# Patient Record
Sex: MALE | Race: OTHER | NOT HISPANIC OR LATINO | URBAN - METROPOLITAN AREA
[De-identification: names, ages, dates, MRNs, and addresses within clinical notes are randomized per-mention and may not be internally consistent; named-entity substitution may affect disease eponyms.]

---

## 2018-07-30 ENCOUNTER — HISTORICAL (OUTPATIENT)
Dept: ADMINISTRATIVE | Facility: HOSPITAL | Age: 63
End: 2018-07-30

## 2018-07-30 LAB
ABS NEUT (OLG): 3.12 X10(3)/MCL (ref 2.1–9.2)
ALBUMIN SERPL-MCNC: 3.8 GM/DL (ref 3.4–5)
ALBUMIN/GLOB SERPL: 1.3 RATIO (ref 1.1–2)
ALP SERPL-CCNC: 70 UNIT/L (ref 50–136)
ALT SERPL-CCNC: 22 UNIT/L (ref 12–78)
APPEARANCE, UA: CLEAR
AST SERPL-CCNC: 22 UNIT/L (ref 15–37)
BACTERIA SPEC CULT: NORMAL /HPF
BASOPHILS # BLD AUTO: 0 X10(3)/MCL (ref 0–0.2)
BASOPHILS NFR BLD AUTO: 0 %
BILIRUB SERPL-MCNC: 0.6 MG/DL (ref 0.2–1)
BILIRUB UR QL STRIP: NEGATIVE
BILIRUBIN DIRECT+TOT PNL SERPL-MCNC: 0.2 MG/DL (ref 0–0.5)
BILIRUBIN DIRECT+TOT PNL SERPL-MCNC: 0.4 MG/DL (ref 0–0.8)
BUN SERPL-MCNC: 19 MG/DL (ref 7–18)
CALCIUM SERPL-MCNC: 8.7 MG/DL (ref 8.5–10.1)
CHLORIDE SERPL-SCNC: 107 MMOL/L (ref 98–107)
CHOLEST SERPL-MCNC: 211 MG/DL (ref 0–200)
CHOLEST/HDLC SERPL: 3 {RATIO} (ref 0–5)
CO2 SERPL-SCNC: 28 MMOL/L (ref 21–32)
COLOR UR: YELLOW
CREAT SERPL-MCNC: 0.84 MG/DL (ref 0.7–1.3)
EOSINOPHIL # BLD AUTO: 0.2 X10(3)/MCL (ref 0–0.9)
EOSINOPHIL NFR BLD AUTO: 4 %
ERYTHROCYTE [DISTWIDTH] IN BLOOD BY AUTOMATED COUNT: 13.1 % (ref 11.5–17)
GLOBULIN SER-MCNC: 3 GM/DL (ref 2.4–3.5)
GLUCOSE (UA): NEGATIVE
GLUCOSE SERPL-MCNC: 104 MG/DL (ref 74–106)
HCT VFR BLD AUTO: 42.8 % (ref 42–52)
HDLC SERPL-MCNC: 70 MG/DL (ref 35–60)
HGB BLD-MCNC: 14.2 GM/DL (ref 14–18)
HGB UR QL STRIP: NEGATIVE
KETONES UR QL STRIP: NEGATIVE
LDLC SERPL CALC-MCNC: 112 MG/DL (ref 0–129)
LEUKOCYTE ESTERASE UR QL STRIP: NEGATIVE
LYMPHOCYTES # BLD AUTO: 1.7 X10(3)/MCL (ref 0.6–4.6)
LYMPHOCYTES NFR BLD AUTO: 30 %
MCH RBC QN AUTO: 31.1 PG (ref 27–31)
MCHC RBC AUTO-ENTMCNC: 33.2 GM/DL (ref 33–36)
MCV RBC AUTO: 93.7 FL (ref 80–94)
MONOCYTES # BLD AUTO: 0.4 X10(3)/MCL (ref 0.1–1.3)
MONOCYTES NFR BLD AUTO: 8 %
NEUTROPHILS # BLD AUTO: 3.12 X10(3)/MCL (ref 2.1–9.2)
NEUTROPHILS NFR BLD AUTO: 57 %
NITRITE UR QL STRIP: NEGATIVE
PH UR STRIP: 5 [PH] (ref 5–9)
PLATELET # BLD AUTO: 165 X10(3)/MCL (ref 130–400)
PMV BLD AUTO: 10.2 FL (ref 9.4–12.4)
POTASSIUM SERPL-SCNC: 4.3 MMOL/L (ref 3.5–5.1)
PROT SERPL-MCNC: 6.8 GM/DL (ref 6.4–8.2)
PROT UR QL STRIP: NEGATIVE
PSA SERPL-MCNC: 4.26 NG/ML (ref 0–4)
RBC # BLD AUTO: 4.57 X10(6)/MCL (ref 4.7–6.1)
RBC #/AREA URNS HPF: NORMAL /[HPF]
SODIUM SERPL-SCNC: 142 MMOL/L (ref 136–145)
SP GR UR STRIP: 1.02 (ref 1–1.03)
SQUAMOUS EPITHELIAL, UA: NORMAL
TRIGL SERPL-MCNC: 147 MG/DL (ref 30–150)
UROBILINOGEN UR STRIP-ACNC: 0.2
VLDLC SERPL CALC-MCNC: 29 MG/DL
WBC # SPEC AUTO: 5.5 X10(3)/MCL (ref 4.5–11.5)
WBC #/AREA URNS HPF: NORMAL /HPF

## 2023-12-12 ENCOUNTER — HOSPITAL ENCOUNTER (EMERGENCY)
Facility: HOSPITAL | Age: 68
Discharge: HOME | End: 2023-12-12
Attending: EMERGENCY MEDICINE
Payer: COMMERCIAL

## 2023-12-12 VITALS
RESPIRATION RATE: 17 BRPM | HEART RATE: 62 BPM | BODY MASS INDEX: 25.06 KG/M2 | OXYGEN SATURATION: 99 % | TEMPERATURE: 97 F | DIASTOLIC BLOOD PRESSURE: 79 MMHG | WEIGHT: 185 LBS | SYSTOLIC BLOOD PRESSURE: 141 MMHG | HEIGHT: 72 IN

## 2023-12-12 DIAGNOSIS — R55 NEAR SYNCOPE: Primary | ICD-10-CM

## 2023-12-12 DIAGNOSIS — T50.901A ACCIDENTAL DRUG INGESTION, INITIAL ENCOUNTER: ICD-10-CM

## 2023-12-12 LAB
ALBUMIN SERPL-MCNC: 4.1 G/DL (ref 3.4–4.8)
ALBUMIN/GLOB SERPL: 1.8 RATIO (ref 1.1–2)
ALP SERPL-CCNC: 88 UNIT/L (ref 40–150)
ALT SERPL-CCNC: 83 UNIT/L (ref 0–55)
APTT PPP: 25.8 SECONDS (ref 23.2–33.7)
AST SERPL-CCNC: 63 UNIT/L (ref 5–34)
BASOPHILS # BLD AUTO: 0.02 X10(3)/MCL
BASOPHILS NFR BLD AUTO: 0.2 %
BILIRUB SERPL-MCNC: 0.7 MG/DL
BNP BLD-MCNC: 57.5 PG/ML
BUN SERPL-MCNC: 19.5 MG/DL (ref 8.4–25.7)
CALCIUM SERPL-MCNC: 9.3 MG/DL (ref 8.8–10)
CHLORIDE SERPL-SCNC: 108 MMOL/L (ref 98–107)
CO2 SERPL-SCNC: 26 MMOL/L (ref 23–31)
CREAT SERPL-MCNC: 1.17 MG/DL (ref 0.73–1.18)
D DIMER PPP IA.FEU-MCNC: <0.27 UG/ML FEU (ref 0–0.5)
EOSINOPHIL # BLD AUTO: 0.11 X10(3)/MCL (ref 0–0.9)
EOSINOPHIL NFR BLD AUTO: 1.3 %
ERYTHROCYTE [DISTWIDTH] IN BLOOD BY AUTOMATED COUNT: 13.2 % (ref 11.5–17)
GFR SERPLBLD CREATININE-BSD FMLA CKD-EPI: >60 MLS/MIN/1.73/M2
GLOBULIN SER-MCNC: 2.3 GM/DL (ref 2.4–3.5)
GLUCOSE SERPL-MCNC: 124 MG/DL (ref 82–115)
HCT VFR BLD AUTO: 42.5 % (ref 42–52)
HGB BLD-MCNC: 14.4 G/DL (ref 14–18)
IMM GRANULOCYTES # BLD AUTO: 0.03 X10(3)/MCL (ref 0–0.04)
IMM GRANULOCYTES NFR BLD AUTO: 0.4 %
INR PPP: 1
LYMPHOCYTES # BLD AUTO: 2.68 X10(3)/MCL (ref 0.6–4.6)
LYMPHOCYTES NFR BLD AUTO: 31.4 %
MCH RBC QN AUTO: 30.6 PG (ref 27–31)
MCHC RBC AUTO-ENTMCNC: 33.9 G/DL (ref 33–36)
MCV RBC AUTO: 90.2 FL (ref 80–94)
MONOCYTES # BLD AUTO: 0.54 X10(3)/MCL (ref 0.1–1.3)
MONOCYTES NFR BLD AUTO: 6.3 %
NEUTROPHILS # BLD AUTO: 5.16 X10(3)/MCL (ref 2.1–9.2)
NEUTROPHILS NFR BLD AUTO: 60.4 %
NRBC BLD AUTO-RTO: 0 %
PLATELET # BLD AUTO: 206 X10(3)/MCL (ref 130–400)
PMV BLD AUTO: 10.4 FL (ref 7.4–10.4)
POTASSIUM SERPL-SCNC: 4 MMOL/L (ref 3.5–5.1)
PROT SERPL-MCNC: 6.4 GM/DL (ref 5.8–7.6)
PROTHROMBIN TIME: 13.1 SECONDS (ref 12.5–14.5)
RBC # BLD AUTO: 4.71 X10(6)/MCL (ref 4.7–6.1)
SODIUM SERPL-SCNC: 141 MMOL/L (ref 136–145)
TROPONIN I SERPL-MCNC: <0.01 NG/ML (ref 0–0.04)
TROPONIN I SERPL-MCNC: <0.01 NG/ML (ref 0–0.04)
WBC # SPEC AUTO: 8.54 X10(3)/MCL (ref 4.5–11.5)

## 2023-12-12 PROCEDURE — 83880 ASSAY OF NATRIURETIC PEPTIDE: CPT | Performed by: EMERGENCY MEDICINE

## 2023-12-12 PROCEDURE — 84484 ASSAY OF TROPONIN QUANT: CPT | Performed by: EMERGENCY MEDICINE

## 2023-12-12 PROCEDURE — 96361 HYDRATE IV INFUSION ADD-ON: CPT

## 2023-12-12 PROCEDURE — 85610 PROTHROMBIN TIME: CPT | Performed by: EMERGENCY MEDICINE

## 2023-12-12 PROCEDURE — 25000003 PHARM REV CODE 250: Performed by: EMERGENCY MEDICINE

## 2023-12-12 PROCEDURE — 80053 COMPREHEN METABOLIC PANEL: CPT | Performed by: EMERGENCY MEDICINE

## 2023-12-12 PROCEDURE — 85379 FIBRIN DEGRADATION QUANT: CPT | Performed by: EMERGENCY MEDICINE

## 2023-12-12 PROCEDURE — 85025 COMPLETE CBC W/AUTO DIFF WBC: CPT | Performed by: EMERGENCY MEDICINE

## 2023-12-12 PROCEDURE — 99284 EMERGENCY DEPT VISIT MOD MDM: CPT | Mod: 25

## 2023-12-12 PROCEDURE — 93005 ELECTROCARDIOGRAM TRACING: CPT

## 2023-12-12 PROCEDURE — 85730 THROMBOPLASTIN TIME PARTIAL: CPT | Performed by: EMERGENCY MEDICINE

## 2023-12-12 PROCEDURE — 96360 HYDRATION IV INFUSION INIT: CPT

## 2023-12-12 RX ADMIN — SODIUM CHLORIDE 1000 ML: 9 INJECTION, SOLUTION INTRAVENOUS at 02:12

## 2023-12-12 NOTE — ED PROVIDER NOTES
Encounter Date: 12/12/2023    SCRIBE #1 NOTE: I, Iglesia Meehan, am scribing for, and in the presence of,  Radha Agustin MD. I have scribed the following portions of the note - Other sections scribed: HPI, ROS, PE.       History     Chief Complaint   Patient presents with    Near Syncope      Pt. Reports an episode of generalized weakness, dizziness, and near syncope. Pt. Reports may have taken double BP medication (unknown)     68 year old male with a pmhx of HTN presents to the ED for a near-syncopal episode onset PTA.  The patient reports associated symptoms of feeling hot, dizziness, and generalized weakness.  The patient denies any LOC.  The patient reports that he was visiting his wife who was admitted to the hospital when he began feeling hot.  The patient suspects he may have taken his blood pressure medication twice on accident today.  The patient states that he takes Losartan.    The history is provided by the patient. No  was used.     Review of patient's allergies indicates:  No Known Allergies  No past medical history on file.  No past surgical history on file.  No family history on file.     Review of Systems   Constitutional:  Negative for fatigue, fever and unexpected weight change.   HENT:  Negative for congestion and rhinorrhea.    Eyes:  Negative for pain.   Respiratory:  Negative for chest tightness, shortness of breath and wheezing.    Cardiovascular:  Negative for chest pain.   Gastrointestinal:  Negative for abdominal pain, constipation, diarrhea, nausea and vomiting.   Genitourinary:  Negative for dysuria.   Musculoskeletal:  Negative for back pain and neck pain.   Skin:  Negative for rash.   Allergic/Immunologic: Negative for environmental allergies, food allergies and immunocompromised state.   Neurological:  Positive for dizziness and weakness (generalized). Negative for speech difficulty.   Hematological:  Does not bruise/bleed easily.   Psychiatric/Behavioral:   Negative for sleep disturbance and suicidal ideas.        Physical Exam     Initial Vitals [12/12/23 1357]   BP Pulse Resp Temp SpO2   105/60 108 (!) 26 97.3 °F (36.3 °C) 99 %      MAP       --         Physical Exam    Nursing note and vitals reviewed.  Constitutional: He appears well-developed and well-nourished. He is not diaphoretic. No distress.   HENT:   Head: Normocephalic and atraumatic.   Eyes: Conjunctivae and EOM are normal. Pupils are equal, round, and reactive to light.   Neck:   Normal range of motion.  Cardiovascular:  Normal rate, regular rhythm, normal heart sounds and intact distal pulses.           No murmur heard.  Pulmonary/Chest: Breath sounds normal. No respiratory distress. He has no wheezes. He has no rales.   Abdominal: Abdomen is soft. He exhibits no distension. There is no abdominal tenderness.   Musculoskeletal:         General: No tenderness or edema. Normal range of motion.      Cervical back: Normal range of motion.     Neurological: He is alert and oriented to person, place, and time. No cranial nerve deficit.   Skin: Skin is warm and dry. Capillary refill takes less than 2 seconds. No rash noted. No erythema.   Psychiatric: He has a normal mood and affect.         ED Course   Procedures  Labs Reviewed   COMPREHENSIVE METABOLIC PANEL - Abnormal; Notable for the following components:       Result Value    Chloride 108 (*)     Glucose Level 124 (*)     Globulin 2.3 (*)     Alanine Aminotransferase 83 (*)     Aspartate Aminotransferase 63 (*)     All other components within normal limits   PROTIME-INR - Normal   APTT - Normal   TROPONIN I - Normal   B-TYPE NATRIURETIC PEPTIDE - Normal   D DIMER, QUANTITATIVE - Normal   TROPONIN I - Normal   CBC W/ AUTO DIFFERENTIAL    Narrative:     The following orders were created for panel order CBC auto differential.  Procedure                               Abnormality         Status                     ---------                                -----------         ------                     CBC with Differential[[id number]]                           Final result                 Please view results for these tests on the individual orders.   CBC WITH DIFFERENTIAL     EKG Readings: (Independently Interpreted)   Initial Reading: No STEMI. Rhythm: Normal Sinus Rhythm. Heart Rate: 63. Ectopy: No Ectopy. Conduction: Normal. ST Segments: Normal ST Segments. T Waves: Normal. Axis: Normal. Clinical Impression: Normal Sinus Rhythm   12/12/2023 @ 1353              Medications   sodium chloride 0.9% bolus 1,000 mL 1,000 mL (0 mLs Intravenous Stopped 12/12/23 4107)     Medical Decision Making  Problems Addressed:  Accidental drug ingestion, initial encounter: acute illness or injury  Near syncope: acute illness or injury    Amount and/or Complexity of Data Reviewed  Labs: ordered.      ED assessment:    Mr. Borja was here visiting his wife when he had a near syncopal episode w/ vasovagal prodrom, improved once lying flat. Suspects he may have taken his BP meds twice today.     Differential diagnosis (including but not limited to):   Accidental medication overdose, hypoglycemia, vasovagal response, orthostatic hypotension, ERASMO, electrolyte derangements, dehydration, emotional distress    ED management:   See ED course below    Amount and/or Complexity of Data Reviewed  Independent historian: none   Summary of history:   External data reviewed: notes from previous ED visits  Summary of data reviewed: only prior ED visit in 2021 w/ COVID 19  Risk and benefits of testing: discussed   Labs: ordered and reviewed    ECG/medicine tests: ordered and independent interpretation performed (see above or ED course)      Risk  Shared decision making     Critical Care  none    I, Radha Agustin MD personally performed the history, PE, MDM, and procedures as documented above and agree with the scribe's documentation.           Scribe Attestation:   Scribe #1: I performed the above  scribed service and the documentation accurately describes the services I performed. I attest to the accuracy of the note.    Attending Attestation:           Physician Attestation for Scribe:  Physician Attestation Statement for Scribe #1: I, Radha Agustin MD, reviewed documentation, as scribed by Iglesia Meehan in my presence, and it is both accurate and complete.             ED Course as of 12/12/23 1743   Tue Dec 12, 2023   1742 Remains symptomatically improved.  Blood pressures have normalized.  Repeat troponin negative.  Advised to monitor for any recurrent symptoms.  May resume home medications tomorrow morning. ED return precautions reviewed at the bedside and provided in the written discharge instructions. All questions answered to the best of my ability.      [KS]      ED Course User Index  [KS] Radha Agustin MD                           Clinical Impression:  Final diagnoses:  [R55] Near syncope (Primary)  [T50.901A] Accidental drug ingestion, initial encounter          ED Disposition Condition    Discharge Stable          ED Prescriptions    None       Follow-up Information       Follow up With Specialties Details Why Contact Info    Your primary care physician  Schedule an appointment as soon as possible for a visit   Call your primary care physician or you can call [phone] to schedule with a primary care physician    [facility] - Emergency Dept Emergency Medicine  As needed, If symptoms worsen [address] [facility] [id number] [phone]             Radha Agustin MD  12/23/23 6361

## 2025-01-16 ENCOUNTER — TELEPHONE (OUTPATIENT)
Dept: INTERNAL MEDICINE | Facility: CLINIC | Age: 70
End: 2025-01-16
Payer: MEDICARE

## 2025-01-27 ENCOUNTER — OFFICE VISIT (OUTPATIENT)
Dept: INTERNAL MEDICINE | Facility: CLINIC | Age: 70
End: 2025-01-27
Payer: MEDICARE

## 2025-01-27 VITALS
SYSTOLIC BLOOD PRESSURE: 104 MMHG | OXYGEN SATURATION: 96 % | DIASTOLIC BLOOD PRESSURE: 68 MMHG | HEART RATE: 71 BPM | BODY MASS INDEX: 24.3 KG/M2 | HEIGHT: 73 IN | WEIGHT: 183.38 LBS | RESPIRATION RATE: 18 BRPM

## 2025-01-27 DIAGNOSIS — E78.5 HYPERLIPIDEMIA, UNSPECIFIED HYPERLIPIDEMIA TYPE: Primary | ICD-10-CM

## 2025-01-27 DIAGNOSIS — I10 ESSENTIAL HYPERTENSION: ICD-10-CM

## 2025-01-27 DIAGNOSIS — Z00.00 WELLNESS EXAMINATION: ICD-10-CM

## 2025-01-27 DIAGNOSIS — C61 PROSTATE CANCER: ICD-10-CM

## 2025-01-27 PROCEDURE — 99204 OFFICE O/P NEW MOD 45 MIN: CPT | Mod: ,,, | Performed by: STUDENT IN AN ORGANIZED HEALTH CARE EDUCATION/TRAINING PROGRAM

## 2025-01-27 RX ORDER — ASPIRIN 81 MG/1
81 TABLET ORAL
COMMUNITY
Start: 2024-12-11

## 2025-01-27 RX ORDER — VIT C/E/ZN/COPPR/LUTEIN/ZEAXAN 250MG-90MG
CAPSULE ORAL DAILY
COMMUNITY

## 2025-01-27 RX ORDER — IRBESARTAN 150 MG/1
TABLET ORAL
COMMUNITY
Start: 2021-10-04

## 2025-01-27 RX ORDER — ROSUVASTATIN CALCIUM 10 MG/1
TABLET, COATED ORAL
COMMUNITY
Start: 2022-01-03

## 2025-01-27 RX ORDER — ZINC GLUCONATE 50 MG
50 TABLET ORAL DAILY
COMMUNITY

## 2025-01-27 RX ORDER — WITCH HAZEL 50 %
2000 PADS, MEDICATED (EA) TOPICAL DAILY
COMMUNITY

## 2025-01-27 NOTE — ASSESSMENT & PLAN NOTE
Will request recent labs from [facility]  Continue rosuvastatin  Has had some muscle cramps, advised patient to take CoQ10 over the counter

## 2025-01-27 NOTE — ASSESSMENT & PLAN NOTE
Colonoscopy: done with [facility], will need to request records  Labs: done with [facility], will request records

## 2025-01-27 NOTE — PROGRESS NOTES
Subjective:      Chris Borja  01/27/2025  [id number]      Chief Complaint: Establish Care       HPI:  Mr Perez is a 68 y/o male patient who is here to establish care. Patient has hypertension, hyperlipidemia, recently diagnosed prostate cancer. Patient does not have new complaints at this time. He is established with Urology and will start radiation treatment soon. Plan of treatment is radiation and medical treatment.     Past Medical History:   Diagnosis Date    High blood cholesterol     Hyperlipidemia     Prostate cancer      Past Surgical History:   Procedure Laterality Date    TOTAL KNEE ARTHROPLASTY Right      Family History   Problem Relation Name Age of Onset    No Known Problems Mother      Cancer Father      No Known Problems Sister      Cancer Brother       Social History     Tobacco Use    Smoking status: Never    Smokeless tobacco: Never   Substance and Sexual Activity    Alcohol use: Yes    Drug use: Not on file    Sexual activity: Not on file     Review of patient's allergies indicates:  No Known Allergies    The following were reviewed at this visit: active problem list, medication list, allergies, family history, social history, and health maintenance.    Medications:    Current Outpatient Medications:     aspirin (ECOTRIN) 81 MG EC tablet, Take 81 mg by mouth., Disp: , Rfl:     cholecalciferol, vitamin D3, (VITAMIN D3) 250 mcg (10,000 unit) Cap capsule, Take by mouth once daily., Disp: , Rfl:     cyanocobalamin 2000 MCG tablet, Take 2,000 mcg by mouth once daily., Disp: , Rfl:     irbesartan (AVAPRO) 150 MG tablet, 90, Disp: , Rfl:     rosuvastatin (CRESTOR) 10 MG tablet, , Disp: , Rfl:     zinc gluconate 50 mg tablet, Take 50 mg by mouth once daily., Disp: , Rfl:       Medications have been reviewed and reconciled with patient at this visit.  Barriers to medications reviewed with patient.    Adverse reactions to current medications reviewed with patient..    Over the counter medications  "reviewed and reconciled with patient.  Review of Systems   Constitutional:  Negative for chills, diaphoresis, fever and weight loss.   HENT:  Negative for congestion, ear discharge, sinus pain and sore throat.    Eyes:  Negative for photophobia.   Respiratory:  Negative for cough, hemoptysis and shortness of breath.    Cardiovascular:  Negative for chest pain, palpitations, claudication, leg swelling and PND.   Gastrointestinal:  Negative for constipation, diarrhea, nausea and vomiting.   Genitourinary:  Negative for dysuria, frequency and urgency.   Musculoskeletal:  Negative for back pain, joint pain, myalgias and neck pain.   Skin:  Negative for itching and rash.   Neurological:  Negative for dizziness, focal weakness and headaches.   Psychiatric/Behavioral:  Negative for depression. The patient does not have insomnia.            Objective:      Vitals:    01/27/25 1332   BP: 104/68   Pulse: 71   Resp: 18   SpO2: 96%   Weight: 83.2 kg (183 lb 6.4 oz)   Height: 6' 1" (1.854 m)       Physical Exam  Constitutional:       Appearance: Normal appearance.   HENT:      Head: Normocephalic and atraumatic.   Cardiovascular:      Rate and Rhythm: Normal rate and regular rhythm.      Pulses: Normal pulses.   Pulmonary:      Effort: Pulmonary effort is normal.      Breath sounds: Normal breath sounds.   Abdominal:      General: Abdomen is flat. Bowel sounds are normal. There is no distension.      Palpations: Abdomen is soft.      Tenderness: There is no abdominal tenderness.   Musculoskeletal:         General: Normal range of motion.      Right lower leg: No edema.      Left lower leg: No edema.   Lymphadenopathy:      Cervical: No cervical adenopathy.   Neurological:      General: No focal deficit present.      Mental Status: He is alert and oriented to person, place, and time.               Assessment and Plan:       1. Hyperlipidemia, unspecified hyperlipidemia type  Assessment & Plan:  Will request recent labs from " [facility]  Continue rosuvastatin  Has had some muscle cramps, advised patient to take CoQ10 over the counter       2. Prostate cancer  Assessment & Plan:  Established with Urology   Will start radiation treatment soon      3. Essential hypertension  Assessment & Plan:  Controlled  Continue irbesartan       4. Wellness examination  Assessment & Plan:  Colonoscopy: done with [facility], will need to request records  Labs: done with [facility], will request records               Follow up: Follow up in about 1 year (around 1/27/2026) for wellness, Labs check.

## 2025-02-28 ENCOUNTER — PATIENT MESSAGE (OUTPATIENT)
Dept: ADMINISTRATIVE | Facility: HOSPITAL | Age: 70
End: 2025-02-28
Payer: MEDICARE

## 2025-02-28 DIAGNOSIS — Z12.12 SCREENING FOR RECTAL CANCER: Primary | ICD-10-CM

## 2025-03-01 DIAGNOSIS — Z12.11 SCREENING FOR COLON CANCER: ICD-10-CM

## 2025-04-01 ENCOUNTER — PATIENT MESSAGE (OUTPATIENT)
Dept: ADMINISTRATIVE | Facility: HOSPITAL | Age: 70
End: 2025-04-01
Payer: MEDICARE

## 2025-05-23 ENCOUNTER — HOSPITAL ENCOUNTER (INPATIENT)
Facility: HOSPITAL | Age: 70
LOS: 1 days | Discharge: HOME | DRG: 312 | End: 2025-05-24
Attending: INTERNAL MEDICINE | Admitting: STUDENT IN AN ORGANIZED HEALTH CARE EDUCATION/TRAINING PROGRAM
Payer: MEDICARE

## 2025-05-23 DIAGNOSIS — R55 SYNCOPE: Primary | ICD-10-CM

## 2025-05-23 LAB
ALBUMIN SERPL-MCNC: 4.4 G/DL (ref 3.4–4.8)
ALBUMIN/GLOB SERPL: 1.2 RATIO (ref 1.1–2)
ALP SERPL-CCNC: 97 UNIT/L (ref 40–150)
ALT SERPL-CCNC: 36 UNIT/L (ref 0–55)
ANION GAP SERPL CALC-SCNC: 11 MEQ/L
AST SERPL-CCNC: 40 UNIT/L (ref 11–45)
BACTERIA #/AREA URNS AUTO: ABNORMAL /HPF
BASOPHILS # BLD AUTO: 0.01 X10(3)/MCL
BASOPHILS NFR BLD AUTO: 0.1 %
BILIRUB SERPL-MCNC: 0.4 MG/DL
BILIRUB UR QL STRIP.AUTO: NEGATIVE
BUN SERPL-MCNC: 29.1 MG/DL (ref 8.4–25.7)
CALCIUM SERPL-MCNC: 9.8 MG/DL (ref 8.8–10)
CHLORIDE SERPL-SCNC: 105 MMOL/L (ref 98–107)
CLARITY UR: CLEAR
CO2 SERPL-SCNC: 22 MMOL/L (ref 23–31)
COLOR UR AUTO: ABNORMAL
CREAT SERPL-MCNC: 0.95 MG/DL (ref 0.72–1.25)
CREAT/UREA NIT SERPL: 31
EOSINOPHIL # BLD AUTO: 0.15 X10(3)/MCL (ref 0–0.9)
EOSINOPHIL NFR BLD AUTO: 1.8 %
ERYTHROCYTE [DISTWIDTH] IN BLOOD BY AUTOMATED COUNT: 13.2 % (ref 11.5–17)
GFR SERPLBLD CREATININE-BSD FMLA CKD-EPI: >60 ML/MIN/1.73/M2
GLOBULIN SER-MCNC: 3.8 GM/DL (ref 2.4–3.5)
GLUCOSE SERPL-MCNC: 100 MG/DL (ref 82–115)
GLUCOSE UR QL STRIP: NORMAL
HCT VFR BLD AUTO: 42.8 % (ref 42–52)
HGB BLD-MCNC: 14.3 G/DL (ref 14–18)
HGB UR QL STRIP: NEGATIVE
IMM GRANULOCYTES # BLD AUTO: 0.03 X10(3)/MCL (ref 0–0.04)
IMM GRANULOCYTES NFR BLD AUTO: 0.4 %
KETONES UR QL STRIP: NEGATIVE
LEUKOCYTE ESTERASE UR QL STRIP: NEGATIVE
LYMPHOCYTES # BLD AUTO: 1.35 X10(3)/MCL (ref 0.6–4.6)
LYMPHOCYTES NFR BLD AUTO: 15.9 %
MAGNESIUM SERPL-MCNC: 2.3 MG/DL (ref 1.6–2.6)
MCH RBC QN AUTO: 30.6 PG (ref 27–31)
MCHC RBC AUTO-ENTMCNC: 33.4 G/DL (ref 33–36)
MCV RBC AUTO: 91.5 FL (ref 80–94)
MONOCYTES # BLD AUTO: 0.5 X10(3)/MCL (ref 0.1–1.3)
MONOCYTES NFR BLD AUTO: 5.9 %
MUCOUS THREADS URNS QL MICRO: ABNORMAL /LPF
NEUTROPHILS # BLD AUTO: 6.44 X10(3)/MCL (ref 2.1–9.2)
NEUTROPHILS NFR BLD AUTO: 75.9 %
NITRITE UR QL STRIP: NEGATIVE
NRBC BLD AUTO-RTO: 0 %
OHS QRS DURATION: 84 MS
OHS QTC CALCULATION: 425 MS
PH UR STRIP: 5 [PH]
PLATELET # BLD AUTO: 178 X10(3)/MCL (ref 130–400)
PMV BLD AUTO: 10.2 FL (ref 7.4–10.4)
POTASSIUM SERPL-SCNC: 5.1 MMOL/L (ref 3.5–5.1)
POTASSIUM SERPL-SCNC: 5.4 MMOL/L (ref 3.5–5.1)
PROT SERPL-MCNC: 8.2 GM/DL (ref 5.8–7.6)
PROT UR QL STRIP: NEGATIVE
RBC # BLD AUTO: 4.68 X10(6)/MCL (ref 4.7–6.1)
RBC #/AREA URNS AUTO: ABNORMAL /HPF
SODIUM SERPL-SCNC: 138 MMOL/L (ref 136–145)
SP GR UR STRIP.AUTO: 1.02 (ref 1–1.03)
SQUAMOUS #/AREA URNS LPF: ABNORMAL /HPF
TROPONIN I SERPL-MCNC: <0.01 NG/ML (ref 0–0.04)
TROPONIN I SERPL-MCNC: <0.01 NG/ML (ref 0–0.04)
UROBILINOGEN UR STRIP-ACNC: NORMAL
WBC # BLD AUTO: 8.48 X10(3)/MCL (ref 4.5–11.5)
WBC #/AREA URNS AUTO: ABNORMAL /HPF

## 2025-05-23 PROCEDURE — 84484 ASSAY OF TROPONIN QUANT: CPT | Performed by: INTERNAL MEDICINE

## 2025-05-23 PROCEDURE — 93005 ELECTROCARDIOGRAM TRACING: CPT

## 2025-05-23 PROCEDURE — 25500020 PHARM REV CODE 255: Performed by: INTERNAL MEDICINE

## 2025-05-23 PROCEDURE — 84132 ASSAY OF SERUM POTASSIUM: CPT | Performed by: INTERNAL MEDICINE

## 2025-05-23 PROCEDURE — 83735 ASSAY OF MAGNESIUM: CPT

## 2025-05-23 PROCEDURE — 99285 EMERGENCY DEPT VISIT HI MDM: CPT | Mod: 25

## 2025-05-23 PROCEDURE — 81001 URINALYSIS AUTO W/SCOPE: CPT

## 2025-05-23 PROCEDURE — 11000001 HC ACUTE MED/SURG PRIVATE ROOM

## 2025-05-23 PROCEDURE — 85025 COMPLETE CBC W/AUTO DIFF WBC: CPT

## 2025-05-23 PROCEDURE — 80053 COMPREHEN METABOLIC PANEL: CPT

## 2025-05-23 PROCEDURE — 93010 ELECTROCARDIOGRAM REPORT: CPT | Mod: ,,, | Performed by: INTERNAL MEDICINE

## 2025-05-23 PROCEDURE — 84484 ASSAY OF TROPONIN QUANT: CPT

## 2025-05-23 PROCEDURE — 63600175 PHARM REV CODE 636 W HCPCS: Performed by: INTERNAL MEDICINE

## 2025-05-23 RX ORDER — POLYETHYLENE GLYCOL 3350 17 G/17G
17 POWDER, FOR SOLUTION ORAL DAILY
Status: DISCONTINUED | OUTPATIENT
Start: 2025-05-24 | End: 2025-05-24 | Stop reason: HOSPADM

## 2025-05-23 RX ORDER — TALC
6 POWDER (GRAM) TOPICAL NIGHTLY PRN
Status: DISCONTINUED | OUTPATIENT
Start: 2025-05-23 | End: 2025-05-24 | Stop reason: HOSPADM

## 2025-05-23 RX ORDER — SODIUM CHLORIDE 0.9 % (FLUSH) 0.9 %
10 SYRINGE (ML) INJECTION
Status: DISCONTINUED | OUTPATIENT
Start: 2025-05-23 | End: 2025-05-24 | Stop reason: HOSPADM

## 2025-05-23 RX ORDER — OXYCODONE HYDROCHLORIDE 5 MG/1
5 TABLET ORAL EVERY 4 HOURS PRN
Refills: 0 | Status: DISCONTINUED | OUTPATIENT
Start: 2025-05-23 | End: 2025-05-24 | Stop reason: HOSPADM

## 2025-05-23 RX ORDER — MORPHINE SULFATE 4 MG/ML
4 INJECTION, SOLUTION INTRAMUSCULAR; INTRAVENOUS EVERY 4 HOURS PRN
Refills: 0 | Status: DISCONTINUED | OUTPATIENT
Start: 2025-05-23 | End: 2025-05-24 | Stop reason: HOSPADM

## 2025-05-23 RX ORDER — ONDANSETRON HYDROCHLORIDE 2 MG/ML
4 INJECTION, SOLUTION INTRAVENOUS EVERY 8 HOURS PRN
Status: DISCONTINUED | OUTPATIENT
Start: 2025-05-23 | End: 2025-05-24 | Stop reason: HOSPADM

## 2025-05-23 RX ORDER — RELUGOLIX 120 MG/1
1 TABLET, FILM COATED ORAL DAILY
COMMUNITY
Start: 2025-04-24

## 2025-05-23 RX ORDER — TAMSULOSIN HYDROCHLORIDE 0.4 MG/1
1 CAPSULE ORAL NIGHTLY
Status: ON HOLD | COMMUNITY
Start: 2025-04-16 | End: 2025-05-24 | Stop reason: HOSPADM

## 2025-05-23 RX ORDER — ENOXAPARIN SODIUM 100 MG/ML
40 INJECTION SUBCUTANEOUS EVERY 24 HOURS
Status: DISCONTINUED | OUTPATIENT
Start: 2025-05-23 | End: 2025-05-24 | Stop reason: HOSPADM

## 2025-05-23 RX ORDER — ACETAMINOPHEN 325 MG/1
650 TABLET ORAL EVERY 8 HOURS PRN
Status: DISCONTINUED | OUTPATIENT
Start: 2025-05-23 | End: 2025-05-24 | Stop reason: HOSPADM

## 2025-05-23 RX ADMIN — IOHEXOL 100 ML: 350 INJECTION, SOLUTION INTRAVENOUS at 08:05

## 2025-05-23 RX ADMIN — ENOXAPARIN SODIUM 40 MG: 40 INJECTION SUBCUTANEOUS at 09:05

## 2025-05-23 NOTE — FIRST PROVIDER EVALUATION
"Medical screening examination initiated.  I have conducted a focused provider triage encounter, findings are as follows:    Brief history of present illness:  69-year-old male presents to the emergency department with complaint of syncopal episode.  Patient reports he drank 1 beer.  States that he started to feel dizzy and then passed out.  He denies any chest pain or shortness of breath.  Per EMS, patient's systolic blood pressures in the 70s.    Vitals:    05/23/25 1530   BP: 113/69   Pulse: 67   Resp: 18   Temp: 98.4 °F (36.9 °C)   TempSrc: Oral   SpO2: 96%   Weight: 79.4 kg (175 lb)   Height: 6' 1" (1.854 m)       Pertinent physical exam:  Awake and alert, NAD    Brief workup plan:  Labs, EKG, chest x-ray    Preliminary workup initiated; this workup will be continued and followed by the physician or advanced practice provider that is assigned to the patient when roomed.  "

## 2025-05-24 VITALS
RESPIRATION RATE: 18 BRPM | OXYGEN SATURATION: 96 % | TEMPERATURE: 98 F | SYSTOLIC BLOOD PRESSURE: 157 MMHG | WEIGHT: 188 LBS | HEIGHT: 72 IN | DIASTOLIC BLOOD PRESSURE: 81 MMHG | HEART RATE: 69 BPM | BODY MASS INDEX: 25.47 KG/M2

## 2025-05-24 PROBLEM — R55 SYNCOPE: Status: ACTIVE | Noted: 2025-05-24

## 2025-05-24 LAB
ANION GAP SERPL CALC-SCNC: 8 MEQ/L
BASOPHILS # BLD AUTO: 0.02 X10(3)/MCL
BASOPHILS NFR BLD AUTO: 0.3 %
BUN SERPL-MCNC: 21.9 MG/DL (ref 8.4–25.7)
CALCIUM SERPL-MCNC: 9.9 MG/DL (ref 8.8–10)
CHLORIDE SERPL-SCNC: 105 MMOL/L (ref 98–107)
CO2 SERPL-SCNC: 23 MMOL/L (ref 23–31)
CREAT SERPL-MCNC: 0.76 MG/DL (ref 0.72–1.25)
CREAT/UREA NIT SERPL: 29
EOSINOPHIL # BLD AUTO: 0.34 X10(3)/MCL (ref 0–0.9)
EOSINOPHIL NFR BLD AUTO: 4.6 %
ERYTHROCYTE [DISTWIDTH] IN BLOOD BY AUTOMATED COUNT: 12.9 % (ref 11.5–17)
GFR SERPLBLD CREATININE-BSD FMLA CKD-EPI: >60 ML/MIN/1.73/M2
GLUCOSE SERPL-MCNC: 91 MG/DL (ref 82–115)
HCT VFR BLD AUTO: 41.5 % (ref 42–52)
HGB BLD-MCNC: 13.5 G/DL (ref 14–18)
IMM GRANULOCYTES # BLD AUTO: 0.02 X10(3)/MCL (ref 0–0.04)
IMM GRANULOCYTES NFR BLD AUTO: 0.3 %
LYMPHOCYTES # BLD AUTO: 2.19 X10(3)/MCL (ref 0.6–4.6)
LYMPHOCYTES NFR BLD AUTO: 29.6 %
MCH RBC QN AUTO: 30.6 PG (ref 27–31)
MCHC RBC AUTO-ENTMCNC: 32.5 G/DL (ref 33–36)
MCV RBC AUTO: 94.1 FL (ref 80–94)
MONOCYTES # BLD AUTO: 0.66 X10(3)/MCL (ref 0.1–1.3)
MONOCYTES NFR BLD AUTO: 8.9 %
NEUTROPHILS # BLD AUTO: 4.16 X10(3)/MCL (ref 2.1–9.2)
NEUTROPHILS NFR BLD AUTO: 56.3 %
NRBC BLD AUTO-RTO: 0 %
PLATELET # BLD AUTO: 149 X10(3)/MCL (ref 130–400)
PMV BLD AUTO: 10.1 FL (ref 7.4–10.4)
POTASSIUM SERPL-SCNC: 4.7 MMOL/L (ref 3.5–5.1)
RBC # BLD AUTO: 4.41 X10(6)/MCL (ref 4.7–6.1)
SODIUM SERPL-SCNC: 136 MMOL/L (ref 136–145)
WBC # BLD AUTO: 7.39 X10(3)/MCL (ref 4.5–11.5)

## 2025-05-24 PROCEDURE — 99239 HOSP IP/OBS DSCHRG MGMT >30: CPT | Mod: ,,, | Performed by: INTERNAL MEDICINE

## 2025-05-24 PROCEDURE — 85025 COMPLETE CBC W/AUTO DIFF WBC: CPT | Performed by: INTERNAL MEDICINE

## 2025-05-24 PROCEDURE — 80048 BASIC METABOLIC PNL TOTAL CA: CPT | Performed by: INTERNAL MEDICINE

## 2025-05-24 PROCEDURE — 36415 COLL VENOUS BLD VENIPUNCTURE: CPT | Performed by: INTERNAL MEDICINE

## 2025-05-24 NOTE — H&P
[facility] - [facility] Hospital Medicine  History & Physical    Patient Name: Chris Borja  MRN: [id number]  Patient Class: IP- Inpatient  Admission Date: 5/23/2025  Attending Physician: Joy Wolfe, *   Primary Care Provider: Joy Wolfe MD         Patient information was obtained from patient and ER records.     Subjective:     Principal Problem:Syncope    Chief Complaint:   Chief Complaint   Patient presents with    Loss of Consciousness     Pt arrives [facility] after a syncopal episode at [facility]. Pt states drinking 1 beer, started to feel dizzy then +LOC. Pt was sitting down & friends stated lasted about 20 sec. Pt has no complaints at this time. Denies dizziness & N/V. Initial SBP with EMS was 70's. Hx of prostate cancer, finished treatments. Pt states been on meds for the last few months that causes intermittent dizziness. First time +LOC. GCS 15 in triage.         HPI: 70 yo male with PMHx of prostate cancer, HLD, and HTN reports to ED via EMS after an episode of LOC. Pt reports he was at a local bar and drank one beer and was sitting down when he passed out. Pt reports he was feeling hot, weak, diaphoretic, and nauseas prior to passing out. Pt denies any chest pain, SOB, vision changes, and states that all of his symptoms have resolved. Pt reports he finished taking radiation pills for his prostate cancer one month ago, however takes Flomax. Pt reports that the Flomax causes him to have episodes of dizziness, however he has never lost consciousness prior to today. Back to baseline.  Episode only lasted around 15 sec.  No LOC.     Past Medical History:   Diagnosis Date    High blood cholesterol     Hyperlipidemia     Prostate cancer        Past Surgical History:   Procedure Laterality Date    TOTAL KNEE ARTHROPLASTY Right        Review of patient's allergies indicates:  No Known Allergies    No current facility-administered medications on file prior to  encounter.     Current Outpatient Medications on File Prior to Encounter   Medication Sig    aspirin (ECOTRIN) 81 MG EC tablet Take 81 mg by mouth.    cholecalciferol, vitamin D3, (VITAMIN D3) 250 mcg (10,000 unit) Cap capsule Take by mouth once daily.    cyanocobalamin 2000 MCG tablet Take 2,000 mcg by mouth once daily.    irbesartan (AVAPRO) 150 MG tablet 90    ORGOVYX 120 mg Tab Take 1 tablet by mouth Daily.    rosuvastatin (CRESTOR) 10 MG tablet     zinc gluconate 50 mg tablet Take 50 mg by mouth once daily.    [DISCONTINUED] tamsulosin (FLOMAX) 0.4 mg Cap Take 1 capsule by mouth every evening.     Family History       Problem Relation (Age of Onset)    Cancer Father, Brother    No Known Problems Mother, Sister          Tobacco Use    Smoking status: Never    Smokeless tobacco: Never   Substance and Sexual Activity    Alcohol use: Yes    Drug use: Not on file    Sexual activity: Not on file     Review of Systems   Constitutional: Negative.    HENT: Negative.     Eyes: Negative.    Respiratory:  Negative for cough, chest tightness and shortness of breath.    Cardiovascular:  Negative for chest pain and leg swelling.   Gastrointestinal:  Negative for abdominal pain, diarrhea, nausea and vomiting.   Endocrine: Negative.    Genitourinary: Negative.    Musculoskeletal: Negative.    Skin: Negative.    Allergic/Immunologic: Negative.    Neurological:  Positive for syncope. Negative for headaches.   Hematological: Negative.    Psychiatric/Behavioral: Negative.       Objective:     Vital Signs (Most Recent):  Temp: 97.7 °F (36.5 °C) (05/24/25 1139)  Pulse: 69 (05/24/25 1139)  Resp: 18 (05/24/25 0355)  BP: (!) 157/81 (05/24/25 1139)  SpO2: 96 % (05/24/25 1139) Vital Signs (24h Range):  Temp:  [97.6 °F (36.4 °C)-98.4 °F (36.9 °C)] 97.7 °F (36.5 °C)  Pulse:  [59-69] 69  Resp:  [14-20] 18  SpO2:  [96 %-99 %] 96 %  BP: (113-157)/(69-83) 157/81     Weight: 85.3 kg (188 lb)  Body mass index is 25.5 kg/m².     Physical  Exam  Eyes:      Pupils: Pupils are equal, round, and reactive to light.   Cardiovascular:      Rate and Rhythm: Normal rate.      Pulses: Normal pulses.      Heart sounds: No murmur heard.  Pulmonary:      Effort: Pulmonary effort is normal.      Breath sounds: Normal breath sounds.   Abdominal:      General: Abdomen is flat. Bowel sounds are normal. There is no distension.      Palpations: Abdomen is soft.      Tenderness: There is no guarding.   Musculoskeletal:         General: Normal range of motion.      Cervical back: Normal range of motion and neck supple.   Skin:     General: Skin is warm.   Neurological:      General: No focal deficit present.      Mental Status: He is alert.   Psychiatric:         Mood and Affect: Mood normal.         Behavior: Behavior normal.              CRANIAL NERVES     CN III, IV, VI   Pupils are equal, round, and reactive to light.       Significant Labs: All pertinent labs within the past 24 hours have been reviewed.  Recent Lab Results  (Last 5 results in the past 24 hours)        05/24/25  0534   05/23/25  2135   05/23/25 2011 05/23/25  1941   05/23/25  1802        Albumin/Globulin Ratio         1.2       Albumin         4.4       ALP         97       ALT         36       Anion Gap 8.0         11.0       Appearance, UA       Clear         AST         40       Bacteria, UA       None Seen         Baso # 0.02         0.01       Basophil % 0.3         0.1       Bilirubin (UA)       Negative         BILIRUBIN TOTAL         0.4       BUN 21.9         29.1       BUN/CREAT RATIO 29         31       Calcium 9.9         9.8       Chloride 105         105       CO2 23         22       Color, UA       Light-Yellow         Creatinine 0.76         0.95       eGFR >60  Comment: Estimated GFR calculated using the CKD-EPI creatinine (2021) equation.         >60  Comment: Estimated GFR calculated using the CKD-EPI creatinine (2021) equation.       Eos # 0.34         0.15       Eos % 4.6          1.8       Globulin, Total         3.8       Glucose 91         100       Glucose, UA       Normal         Hematocrit 41.5         42.8       Hemoglobin 13.5         14.3       Immature Grans (Abs) 0.02         0.03       Immature Granulocytes 0.3         0.4       Ketones, UA       Negative         Leukocyte Esterase, UA       Negative         Lymph # 2.19         1.35       LYMPH % 29.6         15.9       Magnesium          2.30       MCH 30.6         30.6       MCHC 32.5         33.4       MCV 94.1         91.5       Mono # 0.66         0.50       Mono % 8.9         5.9       MPV 10.1         10.2       Mucous, UA       Trace         Neut # 4.16         6.44       Neut % 56.3         75.9       NITRITE UA       Negative         nRBC 0.0         0.0       Blood, UA       Negative         pH, UA       5.0         Platelet Count 149         178       Potassium 4.7     5.1     5.4       PROTEIN TOTAL         8.2       Protein, UA       Negative         RBC 4.41         4.68       RBC, UA       0-5         RDW 12.9         13.2       Sodium 136         138       Specific Gravity,UA       1.019         Squamous Epithelial Cells, UA       Trace         Troponin I   <0.010       <0.010       Urobilinogen, UA       Normal         WBC, UA       0-5         WBC 7.39         8.48                              Significant Imaging: I have reviewed all pertinent imaging results/findings within the past 24 hours.  Assessment/Plan:     Assessment & Plan  Syncope  Resolved.  Hold flomax    Essential hypertension  Patient's blood pressure range in the last 24 hours was: BP  Min: 113/69  Max: 157/81.The patient's inpatient anti-hypertensive regimen is listed below:  Current Antihypertensives       Plan  - BP is controlled, no changes needed to their regimen  - hold irbesartan until further notice  Hold flomax until further notic.  Prostate cancer  Hold flomax. Cont hormone blckers.    VTE Risk Mitigation (From admission, onward)            Ordered     enoxaparin injection 40 mg  Daily         05/23/25 2129     IP VTE HIGH RISK PATIENT  Once         05/23/25 2129     Place sequential compression device  Until discontinued         05/23/25 2129                                    CAITLIN BUSH MD  Department of Hospital Medicine  [facility] - [facility]

## 2025-05-24 NOTE — HPI
68 yo male with PMHx of prostate cancer, HLD, and HTN reports to ED via EMS after an episode of LOC. Pt reports he was at a local bar and drank one beer and was sitting down when he passed out. Pt reports he was feeling hot, weak, diaphoretic, and nauseas prior to passing out. Pt denies any chest pain, SOB, vision changes, and states that all of his symptoms have resolved. Pt reports he finished taking radiation pills for his prostate cancer one month ago, however takes Flomax. Pt reports that the Flomax causes him to have episodes of dizziness, however he has never lost consciousness prior to today. Back to baseline.  Episode only lasted around 15 sec.  No LOC.    - Positive UA on admission, however contaminated UCx (11/9)

## 2025-05-24 NOTE — SUBJECTIVE & OBJECTIVE
Past Medical History:   Diagnosis Date    High blood cholesterol     Hyperlipidemia     Prostate cancer        Past Surgical History:   Procedure Laterality Date    TOTAL KNEE ARTHROPLASTY Right        Review of patient's allergies indicates:  No Known Allergies    No current facility-administered medications on file prior to encounter.     Current Outpatient Medications on File Prior to Encounter   Medication Sig    aspirin (ECOTRIN) 81 MG EC tablet Take 81 mg by mouth.    cholecalciferol, vitamin D3, (VITAMIN D3) 250 mcg (10,000 unit) Cap capsule Take by mouth once daily.    cyanocobalamin 2000 MCG tablet Take 2,000 mcg by mouth once daily.    irbesartan (AVAPRO) 150 MG tablet 90    ORGOVYX 120 mg Tab Take 1 tablet by mouth Daily.    rosuvastatin (CRESTOR) 10 MG tablet     zinc gluconate 50 mg tablet Take 50 mg by mouth once daily.    [DISCONTINUED] tamsulosin (FLOMAX) 0.4 mg Cap Take 1 capsule by mouth every evening.     Family History       Problem Relation (Age of Onset)    Cancer Father, Brother    No Known Problems Mother, Sister          Tobacco Use    Smoking status: Never    Smokeless tobacco: Never   Substance and Sexual Activity    Alcohol use: Yes    Drug use: Not on file    Sexual activity: Not on file     Review of Systems   Constitutional: Negative.    HENT: Negative.     Eyes: Negative.    Respiratory:  Negative for cough, chest tightness and shortness of breath.    Cardiovascular:  Negative for chest pain and leg swelling.   Gastrointestinal:  Negative for abdominal pain, diarrhea, nausea and vomiting.   Endocrine: Negative.    Genitourinary: Negative.    Musculoskeletal: Negative.    Skin: Negative.    Allergic/Immunologic: Negative.    Neurological:  Positive for syncope. Negative for headaches.   Hematological: Negative.    Psychiatric/Behavioral: Negative.       Objective:     Vital Signs (Most Recent):  Temp: 97.7 °F (36.5 °C) (05/24/25 1139)  Pulse: 69 (05/24/25 1139)  Resp: 18 (05/24/25  0355)  BP: (!) 157/81 (05/24/25 1139)  SpO2: 96 % (05/24/25 1139) Vital Signs (24h Range):  Temp:  [97.6 °F (36.4 °C)-98.4 °F (36.9 °C)] 97.7 °F (36.5 °C)  Pulse:  [59-69] 69  Resp:  [14-20] 18  SpO2:  [96 %-99 %] 96 %  BP: (113-157)/(69-83) 157/81     Weight: 85.3 kg (188 lb)  Body mass index is 25.5 kg/m².     Physical Exam  Eyes:      Pupils: Pupils are equal, round, and reactive to light.   Cardiovascular:      Rate and Rhythm: Normal rate.      Pulses: Normal pulses.      Heart sounds: No murmur heard.  Pulmonary:      Effort: Pulmonary effort is normal.      Breath sounds: Normal breath sounds.   Abdominal:      General: Abdomen is flat. Bowel sounds are normal. There is no distension.      Palpations: Abdomen is soft.      Tenderness: There is no guarding.   Musculoskeletal:         General: Normal range of motion.      Cervical back: Normal range of motion and neck supple.   Skin:     General: Skin is warm.   Neurological:      General: No focal deficit present.      Mental Status: He is alert.   Psychiatric:         Mood and Affect: Mood normal.         Behavior: Behavior normal.              CRANIAL NERVES     CN III, IV, VI   Pupils are equal, round, and reactive to light.       Significant Labs: All pertinent labs within the past 24 hours have been reviewed.  Recent Lab Results  (Last 5 results in the past 24 hours)        05/24/25  0534   05/23/25  2135   05/23/25 2011 05/23/25  1941   05/23/25  1802        Albumin/Globulin Ratio         1.2       Albumin         4.4       ALP         97       ALT         36       Anion Gap 8.0         11.0       Appearance, UA       Clear         AST         40       Bacteria, UA       None Seen         Baso # 0.02         0.01       Basophil % 0.3         0.1       Bilirubin (UA)       Negative         BILIRUBIN TOTAL         0.4       BUN 21.9         29.1       BUN/CREAT RATIO 29         31       Calcium 9.9         9.8       Chloride 105         105       CO2  23         22       Color, UA       Light-Yellow         Creatinine 0.76         0.95       eGFR >60  Comment: Estimated GFR calculated using the CKD-EPI creatinine (2021) equation.         >60  Comment: Estimated GFR calculated using the CKD-EPI creatinine (2021) equation.       Eos # 0.34         0.15       Eos % 4.6         1.8       Globulin, Total         3.8       Glucose 91         100       Glucose, UA       Normal         Hematocrit 41.5         42.8       Hemoglobin 13.5         14.3       Immature Grans (Abs) 0.02         0.03       Immature Granulocytes 0.3         0.4       Ketones, UA       Negative         Leukocyte Esterase, UA       Negative         Lymph # 2.19         1.35       LYMPH % 29.6         15.9       Magnesium          2.30       MCH 30.6         30.6       MCHC 32.5         33.4       MCV 94.1         91.5       Mono # 0.66         0.50       Mono % 8.9         5.9       MPV 10.1         10.2       Mucous, UA       Trace         Neut # 4.16         6.44       Neut % 56.3         75.9       NITRITE UA       Negative         nRBC 0.0         0.0       Blood, UA       Negative         pH, UA       5.0         Platelet Count 149         178       Potassium 4.7     5.1     5.4       PROTEIN TOTAL         8.2       Protein, UA       Negative         RBC 4.41         4.68       RBC, UA       0-5         RDW 12.9         13.2       Sodium 136         138       Specific Gravity,UA       1.019         Squamous Epithelial Cells, UA       Trace         Troponin I   <0.010       <0.010       Urobilinogen, UA       Normal         WBC, UA       0-5         WBC 7.39         8.48                              Significant Imaging: I have reviewed all pertinent imaging results/findings within the past 24 hours.

## 2025-05-24 NOTE — DISCHARGE SUMMARY
[facility] - [facility] Hospital Medicine  Discharge Summary      Patient Name: Chris Borja  MRN: [id number]  Admission Date: 5/23/2025  Hospital Length of Stay: 1 days  Discharge Date and Time: 05/24/2025 2:20 PM  Attending Physician: Joy Wolfe, *   Discharging Provider: CAITLIN BUSH MD  Discharge Provider Team: Networked reference to record PCT   Primary Care Provider: Joy Wolfe MD        HPI: 70 yo male with PMHx of prostate cancer, HLD, and HTN reports to ED via EMS after an episode of LOC. Pt reports he was at a local bar and drank one beer and was sitting down when he passed out. Pt reports he was feeling hot, weak, diaphoretic, and nauseas prior to passing out. Pt denies any chest pain, SOB, vision changes, and states that all of his symptoms have resolved. Pt reports he finished taking radiation pills for his prostate cancer one month ago, however takes Flomax. Pt reports that the Flomax causes him to have episodes of dizziness, however he has never lost consciousness prior to today.     * No surgery found *      Hospital Course: episode of near syncope. Back to baseline.  On flomax for hx prostate ca.  On hormone blockers as well. Vssaf. Bp stable. Back to baseline.  Pt is ready for d/c home. No c.o at this time. Hold flomax until further notice.     Consults:     Final Active Diagnoses:    Diagnosis Date Noted POA    PRINCIPAL PROBLEM:  Syncope [R55] 05/24/2025 Unknown    Essential hypertension [I10] 01/27/2025 Yes    Prostate cancer [C61] 01/27/2025 Yes      Problems Resolved During this Admission:      Discharged Condition: good    Disposition: Home or Self Care    Follow Up:   Follow-up Information       Joy Wolfe MD Follow up.    Specialty: Internal Medicine  Why: Someone from [address] [place] [address] will contact the patient with an appointment date and time.  Contact information:  [address] [place], [place] [address] [place] [address] [phone]                            Patient Instructions:   No discharge procedures on file.  Medications:  Reconciled Home Medications:      Medication List        CONTINUE taking these medications      aspirin 81 MG EC tablet  Commonly known as: ECOTRIN  Take 81 mg by mouth.     cholecalciferol (vitamin D3) 250 mcg (10,000 unit) Cap capsule  Commonly known as: VITAMIN D3  Take by mouth once daily.     cyanocobalamin 2000 MCG tablet  Take 2,000 mcg by mouth once daily.     irbesartan 150 MG tablet  Commonly known as: AVAPRO  90     ORGOVYX 120 mg Tab  Generic drug: relugolix  Take 1 tablet by mouth Daily.     rosuvastatin 10 MG tablet  Commonly known as: CRESTOR     zinc gluconate 50 mg tablet  Take 50 mg by mouth once daily.            STOP taking these medications      tamsulosin 0.4 mg Cap  Commonly known as: FLOMAX              Significant Diagnostic Studies: N/A    Pending Diagnostic Studies:       None          Indwelling Lines/Drains at time of discharge:   Lines/Drains/Airways       None                   Time spent on the discharge of patient: 33 minutes         CAITLIN BUSH MD  Department of Hospital Medicine  [facility] - [facility]

## 2025-05-24 NOTE — NURSING
D/C instructions and AVS provided to and reviewed with patient and spouse. IV and tele removed, VSS. D/C floor via W/C transport to main lobby for home.

## 2025-05-24 NOTE — ASSESSMENT & PLAN NOTE
Patient's blood pressure range in the last 24 hours was: BP  Min: 113/69  Max: 157/81.The patient's inpatient anti-hypertensive regimen is listed below:  Current Antihypertensives       Plan  - BP is controlled, no changes needed to their regimen  - hold irbesartan until further notice  Hold flomax until further notic.

## 2025-05-24 NOTE — ED PROVIDER NOTES
Encounter Date: 5/23/2025    SCRIBE #1 NOTE: I, Pari Canales, am scribing for, and in the presence of,  Jose Devlin DO. I have scribed the following portions of the note - the EKG reading. Other sections scribed: HPI, ROS, PE.       History     Chief Complaint   Patient presents with    Loss of Consciousness     Pt arrives [facility] after a syncopal episode at [facility]. Pt states drinking 1 beer, started to feel dizzy then +LOC. Pt was sitting down & friends stated lasted about 20 sec. Pt has no complaints at this time. Denies dizziness & N/V. Initial SBP with EMS was 70's. Hx of prostate cancer, finished treatments. Pt states been on meds for the last few months that causes intermittent dizziness. First time +LOC. GCS 15 in triage.      70 yo male with PMHx of prostate cancer, HLD, and HTN reports to ED via EMS after an episode of LOC. Pt reports he was at a local bar and drank one beer and was sitting down when he passed out. Pt reports he was feeling hot, weak, diaphoretic, and nauseas prior to passing out. Pt denies any chest pain, SOB, vision changes, and states that all of his symptoms have resolved. Pt reports he finished taking radiation pills for his prostate cancer one month ago, however takes Flomax. Pt reports that the Flomax causes him to have episodes of dizziness, however he has never lost consciousness prior to today.    Pt's wife reports she caught him, that he was unconscious for approximately 15 seconds, and was pale.    The history is provided by the patient, the spouse and medical records.     Review of patient's allergies indicates:  No Known Allergies  Past Medical History:   Diagnosis Date    High blood cholesterol     Hyperlipidemia     Prostate cancer      Past Surgical History:   Procedure Laterality Date    TOTAL KNEE ARTHROPLASTY Right      Family History   Problem Relation Name Age of Onset    No Known Problems Mother      Cancer Father      No Known Problems Sister      Cancer  Brother       Social History[1]  Review of Systems   Constitutional:  Positive for diaphoresis and fatigue. Negative for chills and fever.        Generalized weakness. Hot flashes.   HENT:  Negative for ear pain, postnasal drip, rhinorrhea, sinus pain and sore throat.    Eyes:  Negative for visual disturbance.   Respiratory:  Negative for cough and shortness of breath.    Cardiovascular:  Negative for chest pain, palpitations and leg swelling.   Gastrointestinal:  Positive for nausea. Negative for abdominal pain, blood in stool, constipation, diarrhea and vomiting.   Endocrine: Negative for polyuria.   Genitourinary:  Negative for dysuria, flank pain and hematuria.   Musculoskeletal:  Negative for back pain, gait problem, joint swelling, myalgias and neck pain.   Skin:  Negative for rash.   Allergic/Immunologic: Negative for immunocompromised state.   Neurological:  Positive for syncope, weakness (Generalized) and light-headedness. Negative for dizziness, tremors, seizures, facial asymmetry, speech difficulty, numbness and headaches.   Hematological:  Does not bruise/bleed easily.       Physical Exam     Initial Vitals [05/23/25 1530]   BP Pulse Resp Temp SpO2   113/69 67 18 98.4 °F (36.9 °C) 96 %      MAP       --         Physical Exam    Constitutional: He appears well-developed and well-nourished. He is not diaphoretic. He is cooperative.  Non-toxic appearance. He does not appear ill.   HENT:   Head: Normocephalic and atraumatic.   Right Ear: Hearing, tympanic membrane, external ear and ear canal normal.   Left Ear: Hearing, tympanic membrane, external ear and ear canal normal.   Nose: Nose normal. Mouth/Throat: Uvula is midline, oropharynx is clear and moist and mucous membranes are normal.   Eyes: Conjunctivae, EOM and lids are normal. Pupils are equal, round, and reactive to light.   Neck: Trachea normal and phonation normal. Neck supple. Carotid bruit is not present. No JVD present.    Full passive range of  motion without pain.     Cardiovascular:  Normal rate, regular rhythm, normal heart sounds and normal pulses.           No murmur heard.  Pulmonary/Chest: No accessory muscle usage. No tachypnea. No respiratory distress. He has no decreased breath sounds. He has no wheezes. He has no rhonchi. He has no rales.   Abdominal: Abdomen is soft. Bowel sounds are normal. He exhibits no distension. There is no abdominal tenderness. No hernia. There is no rebound and no guarding.   Musculoskeletal:      Cervical back: Full passive range of motion without pain and neck supple.     Neurological: He is alert and oriented to person, place, and time. He has normal strength and normal reflexes. No cranial nerve deficit or sensory deficit. He displays a negative Romberg sign. GCS eye subscore is 4. GCS verbal subscore is 5. GCS motor subscore is 6.   Skin: Skin is warm, dry and intact. Capillary refill takes less than 2 seconds. No rash noted.   Psychiatric: He has a normal mood and affect. His speech is normal and behavior is normal. Judgment and thought content normal. Cognition and memory are normal.         ED Course   Procedures  Labs Reviewed   COMPREHENSIVE METABOLIC PANEL - Abnormal       Result Value    Sodium 138      Potassium 5.4 (*)     Chloride 105      CO2 22 (*)     Glucose 100      Blood Urea Nitrogen 29.1 (*)     Creatinine 0.95      Calcium 9.8      Protein Total 8.2 (*)     Albumin 4.4      Globulin 3.8 (*)     Albumin/Globulin Ratio 1.2      Bilirubin Total 0.4      ALP 97      ALT 36      AST 40      eGFR >60      Anion Gap 11.0      BUN/Creatinine Ratio 31     URINALYSIS, REFLEX TO URINE CULTURE - Abnormal    Color, UA Light-Yellow      Appearance, UA Clear      Specific Gravity, UA 1.019      pH, UA 5.0      Protein, UA Negative      Glucose, UA Normal      Ketones, UA Negative      Blood, UA Negative      Bilirubin, UA Negative      Urobilinogen, UA Normal      Nitrites, UA Negative      Leukocyte Esterase,  UA Negative      RBC, UA 0-5      WBC, UA 0-5      Bacteria, UA None Seen      Squamous Epithelial Cells, UA Trace      Mucous, UA Trace (*)    CBC WITH DIFFERENTIAL - Abnormal    WBC 8.48      RBC 4.68 (*)     Hgb 14.3      Hct 42.8      MCV 91.5      MCH 30.6      MCHC 33.4      RDW 13.2      Platelet 178      MPV 10.2      Neut % 75.9      Lymph % 15.9      Mono % 5.9      Eos % 1.8      Basophil % 0.1      Imm Grans % 0.4      Neut # 6.44      Lymph # 1.35      Mono # 0.50      Eos # 0.15      Baso # 0.01      Imm Gran # 0.03      NRBC% 0.0     MAGNESIUM - Normal    Magnesium Level 2.30     TROPONIN I - Normal    Troponin-I <0.010     POTASSIUM - Normal    Potassium 5.1     CBC W/ AUTO DIFFERENTIAL    Narrative:     The following orders were created for panel order CBC auto differential.  Procedure                               Abnormality         Status                     ---------                               -----------         ------                     CBC with Differential[[id number]]       Abnormal            Final result                 Please view results for these tests on the individual orders.   TROPONIN I     EKG Readings: (Independently Interpreted)   Initial Reading: No STEMI. Rhythm: Normal Sinus Rhythm. Heart Rate: 68. Ectopy: No Ectopy. Conduction: Normal. ST Segments: Normal ST Segments. T Waves: Normal. Axis: Normal. Clinical Impression: Normal Sinus Rhythm   Taken at 1521.     ECG Results              EKG 12-lead (Final result)        Collection Time Result Time QRS Duration OHS QTC Calculation    05/23/25 15:21:30 05/23/25 18:50:30 84 425                     Final result by Interface, Lab In [facility] (05/23/25 18:50:35)                   Narrative:    Test Reason : R55,    Vent. Rate :  68 BPM     Atrial Rate :  68 BPM     P-R Int : 190 ms          QRS Dur :  84 ms      QT Int : 400 ms       P-R-T Axes :  70  70  53 degrees    QTcB Int : 425 ms    Normal sinus rhythm  Cannot rule out  Anterior infarct ,age undetermined  Abnormal ECG  Confirmed by Roger Arteaga ([id number]) on 5/23/2025 6:50:29 PM    Referred By:            Confirmed By: Roger Arteaga                                  Imaging Results              CTA Chest Non-Coronary (PE Studies) (Final result)  Result time 05/23/25 21:13:14      Final result by Chidi Bailey MD (05/23/25 21:13:14)                   Narrative:    EXAMINATION  CTA CHEST NON CORONARY (PE STUDIES)    CLINICAL HISTORY  Pulmonary embolism (PE) suspected, high prob;  syncope, chest pain    TECHNIQUE  Post-contrast helical-acquisition CT images were obtained, with bolus timing to pulmonary arterial system for purposes of PE protocol CTA.  Multiplanar reconstructions, including MIP images, were accomplished by a CT technologist at a separate workstation, pushed to PACS for physician review.    CONTRAST  IV: Omnipaque 350, 100 mL    COMPARISON  None available at the time of initial interpretation.    FINDINGS  Images were reviewed in soft tissue, lung, and bone windows.    Exam quality: adequate for evaluation    Lines/tubes: none visualized    Pulmonary Vessels: No central or large segmental filling defect.    Cardiovascular: No suggestion of right heart strain; the RV:LV ratio is <1. No significant heart chamber enlargement. There is no pericardial effusion. No focal contour irregularity or intraluminal abnormality is appreciated involving the visualized aorta and branch vessels.    Lungs/Pleura: Central airways are patent. Bilateral dependent ventilatory changes are present.  No acute airspace consolidation or suspicious focal lesion. No pleural thickening, significant effusion, or evidence of pneumothorax.    Other Findings: No pathologic alysha enlargement or necrotic process.  The visualized lower neck tissues and included upper abdominal organs are without acute abnormality or other suspicious focal process.  Small hiatal hernia is incidentally noted.  No  abnormality of the thoracic wall soft tissues. There are degenerative changes of the spinal column.  No acute or destructive osseous process.    IMPRESSION  1. No central or segmental pulmonary thromboembolism, or other acute intrathoracic process.  2. Small hiatal hernia.  3. Additional chronic secondary findings discussed above..    RADIATION DOSE  Automated tube current modulation, weight-based exposure dosing, and/or iterative reconstruction technique utilized to reach lowest reasonably achievable exposure rate.    DLP: 384 mGy*cm      Electronically signed by: Chidi Bailey  Date:    05/23/2025  Time:    21:13                                     X-Ray Chest AP Portable (Final result)  Result time 05/23/25 16:38:37      Final result by Yari العلي MD (05/23/25 16:38:37)                   Impression:      No acute abnormality of the chest.      Electronically signed by: Yari العلي  Date:    05/23/2025  Time:    16:38               Narrative:    EXAMINATION:  XR CHEST AP PORTABLE    CLINICAL HISTORY:  Syncope and collapse    COMPARISON:  None    FINDINGS:  The heart is normal in size.  The lungs are clear.  No pleural effusion or visible pneumothorax.                                       Medications   sodium chloride 0.9% flush 10 mL (has no administration in time range)   melatonin tablet 6 mg (has no administration in time range)   enoxaparin injection 40 mg (has no administration in time range)   acetaminophen tablet 650 mg (has no administration in time range)   oxyCODONE immediate release tablet 5 mg (has no administration in time range)   morphine injection 4 mg (has no administration in time range)   polyethylene glycol packet 17 g (has no administration in time range)   ondansetron injection 4 mg (has no administration in time range)   iohexoL (OMNIPAQUE 350) injection 100 mL (100 mLs Intravenous Given 5/23/25 2054)     Medical Decision Making  69-year-old male presenting with  syncope    Differential Diagnosis:   Judging by the patient's chief complaint and pertinent history, the patient has the following possible differential diagnoses, including but not limited to the following.  Some of these are deemed to be lower likelihood and some more likely based on my physical exam and history combined with possible lab work and/or imaging studies.   Please see the pertinent studies, and refer to the HPI.  Some of these diagnoses will take further evaluation to fully rule out, perhaps as an outpatient and the patient was encouraged to follow up when discharged for more comprehensive evaluation    Arrhythmia, long QT, Brugada, valvular disease, dissection, congestive heart failure, PE, tamponade, vasovagal, dehydration, CVA, TIA, seizure    Problems Addressed:  Syncope: undiagnosed new problem with uncertain prognosis    Amount and/or Complexity of Data Reviewed  Independent Historian: spouse     Details: Pt's wife reports she caught him, that he was unconscious for approximately 15 seconds, and was pale.  External Data Reviewed: labs, radiology, ECG and notes.  Labs: ordered. Decision-making details documented in ED Course.  Radiology: ordered and independent interpretation performed. Decision-making details documented in ED Course.  ECG/medicine tests: ordered and independent interpretation performed. Decision-making details documented in ED Course.     Details: Initial Reading: No STEMI. Rhythm: Normal Sinus Rhythm. Heart Rate: 68. Ectopy: No Ectopy. Conduction: Normal. ST Segments: Normal ST Segments. T Waves: Normal. Axis: Normal. Clinical Impression: Normal Sinus Rhythm   Taken at 1521.    Risk  OTC drugs.  Prescription drug management.  Decision regarding hospitalization.            Scribe Attestation:   Scribe #1: I performed the above scribed service and the documentation accurately describes the services I performed. I attest to the accuracy of the note.    Attending Attestation:            Physician Attestation for Scribe:  Physician Attestation Statement for Scribe #1: I, Jose Devlin DO, reviewed documentation, as scribed by Pari Canales in my presence, and it is both accurate and complete.             ED Course as of 05/23/25 2129   Fri May 23, 2025   1954 On my independent interpretation, chest x-ray is without obvious pneumonia, pneumothorax, effusions, pneumomediastinum, wide mediastinum, pneumoperitoneum, cardiomegaly, edema.   [MM]   1955 Troponin I: <0.010 [MM]   1955 Magnesium : 2.30 [MM]   1955 Sodium: 138 [MM]   1955 Potassium(!): 5.4  Stable renal function, possibly laboratory error we will recheck [MM]   1955 Chloride: 105 [MM]   1955 CO2(!): 22 [MM]   1955 Glucose: 100 [MM]   1955 BUN(!): 29.1 [MM]   1955 Creatinine: 0.95 [MM]   1955 Calcium: 9.8 [MM]   1955 WBC: 8.48 [MM]   1955 Hemoglobin: 14.3 [MM]   1955 Platelet Count: 178 [MM]   2030 NITRITE UA: Negative [MM]   2030 Leukocyte Esterase, UA: Negative [MM]   2030 RBC, UA: 0-5 [MM]   2030 WBC, UA: 0-5 [MM]   2030 Bacteria, UA: None Seen [MM]   2119 Potassium: 5.1 [MM]   2120 CTA of the chest is without PE, pneumonia, pneumothorax but there was a small hiatal hernia [MM]   2123 Results discussed with the patient and wife at bedside.  He is Rindge syncope score is 2, he is moderate risk.  After shared decision-making they are agreeable with admission.  I will paged Dr. Arsen Downing [MM]   2126 Patient discussed with Dr. Dalton, who does agree with admission for Dr. Arsen Downing. [MM]      ED Course User Index  [MM] Jose Devlin DO                           Clinical Impression:  Final diagnoses:  [R55] Syncope (Primary)          ED Disposition Condition    Admit                       [1]   Social History  Tobacco Use    Smoking status: Never    Smokeless tobacco: Never   Substance Use Topics    Alcohol use: Yes        Jose Devlin DO  05/23/25 2129

## 2025-05-30 ENCOUNTER — OFFICE VISIT (OUTPATIENT)
Dept: INTERNAL MEDICINE | Facility: CLINIC | Age: 70
End: 2025-05-30
Payer: MEDICARE

## 2025-05-30 VITALS
DIASTOLIC BLOOD PRESSURE: 77 MMHG | HEART RATE: 73 BPM | TEMPERATURE: 98 F | BODY MASS INDEX: 25.47 KG/M2 | OXYGEN SATURATION: 98 % | HEIGHT: 72 IN | SYSTOLIC BLOOD PRESSURE: 114 MMHG | WEIGHT: 188 LBS

## 2025-05-30 DIAGNOSIS — Z09 HOSPITAL DISCHARGE FOLLOW-UP: Primary | ICD-10-CM

## 2025-05-30 DIAGNOSIS — C61 PROSTATE CANCER: ICD-10-CM

## 2025-05-30 DIAGNOSIS — I10 ESSENTIAL HYPERTENSION: ICD-10-CM

## 2025-05-30 RX ORDER — UBIDECARENONE 30 MG
30 CAPSULE ORAL DAILY
COMMUNITY

## 2025-05-30 RX ORDER — BUTALB/ACETAMINOPHEN/CAFFEINE 50-325-40
1 TABLET ORAL 2 TIMES DAILY
COMMUNITY

## 2025-05-30 NOTE — ASSESSMENT & PLAN NOTE
No additional episodes of dizziness since he was discharged from the hospital  Continue to hold tamsulosin  Encouraged patient to keep doing light exercise as walking around his block to help with weakness

## 2025-05-30 NOTE — PROGRESS NOTES
"Subjective:      Chris Borja  05/30/2025  [id number]      Chief Complaint: Hospital Follow Up (Pt is here for a hospital follow up. Syncope.)       History of Present Illness    Mr Perez presents today for hospital follow up. Patient was admitted over the weekend after a recent fainting episode. He experienced a syncopal episode at an outdoor social gathering last Friday. Prior to losing consciousness, he experienced hot flashes and lightheadedness while sitting at a table, prompting him to move to a bench with back support. Blood pressure was 75/47 when emergency services arrived. He was transported to hospital where he received IV fluids with subsequent improvement in symptoms. He denies any current lightheadedness. He is currently receiving radiation treatment for prostate cancer and is in the last month of hormone therapy. He experiences frequent hot flashes as a side effect of the hormone therapy. He reports incomplete bladder emptying, requiring him to void, sit down, and return to finish urinating. He discontinued Flomax at discharge from the hospital due to persistent lightheadedness, even when taking it every other day. He prefers managing incomplete bladder emptying over experiencing medication side effects. He reports feeling much better overall despite experiencing weakness which he describes as "very weak." He has experienced a significant decrease in physical activity since his cancer diagnosis in November. Previously, he was very active, walking 7-10 miles daily. He has a history of knee replacement which restricted running, leading him to transition to walking as his primary exercise. His current lack of exercise is attributed to cancer diagnosis, treatment, and associated weakness.           Past Medical History:   Diagnosis Date    High blood cholesterol     Hyperlipidemia     Prostate cancer      Past Surgical History:   Procedure Laterality Date    TOTAL KNEE ARTHROPLASTY Right      Family " History   Problem Relation Name Age of Onset    No Known Problems Mother      Cancer Father      No Known Problems Sister      Cancer Brother       Social History     Tobacco Use    Smoking status: Never    Smokeless tobacco: Never   Substance and Sexual Activity    Alcohol use: Yes    Drug use: Not on file    Sexual activity: Not on file     Review of patient's allergies indicates:  No Known Allergies    The following were reviewed at this visit: active problem list, medication list, allergies, family history, social history, and health maintenance.    Medications:  Current Medications[1]      Medications have been reviewed and reconciled with patient at this visit.  Barriers to medications reviewed with patient.    Adverse reactions to current medications reviewed with patient..    Over the counter medications reviewed and reconciled with patient.  Review of Systems   Constitutional:  Negative for chills, diaphoresis, fever and weight loss.   HENT:  Negative for congestion, ear discharge, sinus pain and sore throat.    Eyes:  Negative for photophobia.   Respiratory:  Negative for cough, hemoptysis and shortness of breath.    Cardiovascular:  Negative for chest pain, palpitations, claudication, leg swelling and PND.   Gastrointestinal:  Negative for constipation, diarrhea, nausea and vomiting.   Genitourinary:  Negative for dysuria, frequency and urgency.   Musculoskeletal:  Negative for back pain, joint pain, myalgias and neck pain.   Skin:  Negative for itching and rash.   Neurological:  Negative for dizziness, focal weakness and headaches.   Psychiatric/Behavioral:  Negative for depression. The patient does not have insomnia.            Objective:      Vitals:    05/30/25 0827   BP: 114/77   BP Location: Left arm   Patient Position: Sitting   Pulse: 73   Temp: 98.2 °F (36.8 °C)   SpO2: 98%   Weight: 85.3 kg (188 lb)   Height: 6' (1.829 m)       Physical Exam  Constitutional:       Appearance: Normal appearance.    HENT:      Head: Normocephalic and atraumatic.   Cardiovascular:      Rate and Rhythm: Normal rate and regular rhythm.      Pulses: Normal pulses.   Pulmonary:      Effort: Pulmonary effort is normal.      Breath sounds: Normal breath sounds.   Abdominal:      General: Abdomen is flat. Bowel sounds are normal. There is no distension.      Palpations: Abdomen is soft.      Tenderness: There is no abdominal tenderness.   Musculoskeletal:         General: Normal range of motion.      Right lower leg: Edema present.      Left lower leg: Edema present.   Lymphadenopathy:      Cervical: No cervical adenopathy.   Neurological:      General: No focal deficit present.      Mental Status: He is alert and oriented to person, place, and time.               Assessment and Plan:       1. Hospital discharge follow-up  Assessment & Plan:  No additional episodes of dizziness since he was discharged from the hospital  Continue to hold tamsulosin  Encouraged patient to keep doing light exercise as walking around his block to help with weakness       2. Essential hypertension  Assessment & Plan:  Controlled  Continue irbesartan       3. Prostate cancer  Assessment & Plan:  Established with Urology  Continue hold tamsulosin  Continue Orgovyx               Follow up: Follow up as scheduled for wellness or sooner if needed              [1]   Current Outpatient Medications:     aspirin (ECOTRIN) 81 MG EC tablet, Take 81 mg by mouth., Disp: , Rfl:     calcium citrate-vitamin D3 315-200 mg (CITRACAL+D) 315 mg-5 mcg (200 unit) per tablet, Take 1 tablet by mouth 2 (two) times daily., Disp: , Rfl:     cholecalciferol, vitamin D3, (VITAMIN D3) 250 mcg (10,000 unit) Cap capsule, Take by mouth once daily., Disp: , Rfl:     co-enzyme Q-10 30 mg capsule, Take 30 mg by mouth once daily., Disp: , Rfl:     cyanocobalamin 2000 MCG tablet, Take 2,000 mcg by mouth once daily., Disp: , Rfl:     irbesartan (AVAPRO) 150 MG tablet, 90, Disp: , Rfl:      ORGOVYX 120 mg Tab, Take 1 tablet by mouth Daily., Disp: , Rfl:     rosuvastatin (CRESTOR) 10 MG tablet, , Disp: , Rfl:     zinc gluconate 50 mg tablet, Take 50 mg by mouth once daily., Disp: , Rfl: